# Patient Record
Sex: MALE | Race: ASIAN | NOT HISPANIC OR LATINO | URBAN - METROPOLITAN AREA
[De-identification: names, ages, dates, MRNs, and addresses within clinical notes are randomized per-mention and may not be internally consistent; named-entity substitution may affect disease eponyms.]

---

## 2017-09-08 ENCOUNTER — TRANSFERRED RECORDS (OUTPATIENT)
Dept: HEALTH INFORMATION MANAGEMENT | Facility: CLINIC | Age: 52
End: 2017-09-08

## 2017-10-02 PROBLEM — F33.9 EPISODE OF RECURRENT MAJOR DEPRESSIVE DISORDER (H): Status: ACTIVE | Noted: 2017-10-02

## 2017-10-02 PROBLEM — F43.10 PTSD (POST-TRAUMATIC STRESS DISORDER): Status: ACTIVE | Noted: 2017-10-02

## 2021-05-15 ENCOUNTER — IMMUNIZATION (OUTPATIENT)
Dept: FAMILY MEDICINE | Facility: CLINIC | Age: 56
End: 2021-05-15
Payer: COMMERCIAL

## 2021-05-15 PROCEDURE — 0001A PR COVID VAC PFIZER DIL RECON 30 MCG/0.3 ML IM: CPT

## 2021-05-15 PROCEDURE — 91300 PR COVID VAC PFIZER DIL RECON 30 MCG/0.3 ML IM: CPT

## 2021-06-05 ENCOUNTER — IMMUNIZATION (OUTPATIENT)
Dept: FAMILY MEDICINE | Facility: CLINIC | Age: 56
End: 2021-06-05
Attending: FAMILY MEDICINE
Payer: COMMERCIAL

## 2021-06-05 PROCEDURE — 91300 PR COVID VAC PFIZER DIL RECON 30 MCG/0.3 ML IM: CPT

## 2021-06-05 PROCEDURE — 0002A PR COVID VAC PFIZER DIL RECON 30 MCG/0.3 ML IM: CPT

## 2022-01-14 ENCOUNTER — IMMUNIZATION (OUTPATIENT)
Dept: FAMILY MEDICINE | Facility: CLINIC | Age: 57
End: 2022-01-14
Payer: COMMERCIAL

## 2022-01-14 PROCEDURE — 99207 PR NO CHARGE LOS: CPT

## 2022-01-14 PROCEDURE — 91306 COVID-19,PF,MODERNA (18+ YRS BOOSTER .25ML): CPT

## 2022-01-14 PROCEDURE — 0064A COVID-19,PF,MODERNA (18+ YRS BOOSTER .25ML): CPT

## 2022-01-14 NOTE — NURSING NOTE
Due to patient being non-English speaking/uses sign language, an  was used for this visit. Only for face-to-face interpretation by an external agency, date and length of interpretation can be found on the scanned worksheet.     name: POWER   Agency: [employer]  Language: Lorne Olivia    Telephone number:   Type of interpretation: Face-to-face, spoken  '

## 2024-02-23 ENCOUNTER — OFFICE VISIT (OUTPATIENT)
Dept: FAMILY MEDICINE | Facility: CLINIC | Age: 59
End: 2024-02-23
Payer: COMMERCIAL

## 2024-02-23 VITALS
WEIGHT: 165.2 LBS | TEMPERATURE: 97.7 F | HEART RATE: 90 BPM | OXYGEN SATURATION: 97 % | SYSTOLIC BLOOD PRESSURE: 146 MMHG | DIASTOLIC BLOOD PRESSURE: 98 MMHG | BODY MASS INDEX: 27.52 KG/M2 | HEIGHT: 65 IN | RESPIRATION RATE: 20 BRPM

## 2024-02-23 DIAGNOSIS — R03.0 ELEVATED BLOOD PRESSURE READING WITHOUT DIAGNOSIS OF HYPERTENSION: ICD-10-CM

## 2024-02-23 DIAGNOSIS — Z11.59 NEED FOR HEPATITIS C SCREENING TEST: ICD-10-CM

## 2024-02-23 DIAGNOSIS — Z00.00 ANNUAL PHYSICAL EXAM: Primary | ICD-10-CM

## 2024-02-23 DIAGNOSIS — Z11.4 SCREENING FOR HIV (HUMAN IMMUNODEFICIENCY VIRUS): ICD-10-CM

## 2024-02-23 DIAGNOSIS — Z00.00 ROUTINE GENERAL MEDICAL EXAMINATION AT A HEALTH CARE FACILITY: ICD-10-CM

## 2024-02-23 DIAGNOSIS — E66.3 OVERWEIGHT (BMI 25.0-29.9): ICD-10-CM

## 2024-02-23 DIAGNOSIS — Z12.5 SCREENING FOR PROSTATE CANCER: ICD-10-CM

## 2024-02-23 DIAGNOSIS — Z12.11 SCREEN FOR COLON CANCER: ICD-10-CM

## 2024-02-23 DIAGNOSIS — Z71.6 ENCOUNTER FOR TOBACCO USE CESSATION COUNSELING: ICD-10-CM

## 2024-02-23 DIAGNOSIS — Z87.891 PERSONAL HISTORY OF TOBACCO USE: ICD-10-CM

## 2024-02-23 LAB
ANION GAP SERPL CALCULATED.3IONS-SCNC: 9 MMOL/L (ref 7–15)
BUN SERPL-MCNC: 14.4 MG/DL (ref 6–20)
CALCIUM SERPL-MCNC: 9.3 MG/DL (ref 8.6–10)
CHLORIDE SERPL-SCNC: 105 MMOL/L (ref 98–107)
CHOLEST SERPL-MCNC: 171 MG/DL
CREAT SERPL-MCNC: 0.67 MG/DL (ref 0.67–1.17)
DEPRECATED HCO3 PLAS-SCNC: 24 MMOL/L (ref 22–29)
EGFRCR SERPLBLD CKD-EPI 2021: >90 ML/MIN/1.73M2
ERYTHROCYTE [DISTWIDTH] IN BLOOD BY AUTOMATED COUNT: 11.8 % (ref 10–15)
FASTING STATUS PATIENT QL REPORTED: ABNORMAL
GLUCOSE SERPL-MCNC: 146 MG/DL (ref 70–99)
HBA1C MFR BLD: 5.2 % (ref 0–5.6)
HCT VFR BLD AUTO: 41.9 % (ref 40–53)
HCV AB SERPL QL IA: NONREACTIVE
HDLC SERPL-MCNC: 37 MG/DL
HGB BLD-MCNC: 13.8 G/DL (ref 13.3–17.7)
HIV 1+2 AB+HIV1 P24 AG SERPL QL IA: NONREACTIVE
LDLC SERPL CALC-MCNC: 113 MG/DL
MCH RBC QN AUTO: 28.7 PG (ref 26.5–33)
MCHC RBC AUTO-ENTMCNC: 32.9 G/DL (ref 31.5–36.5)
MCV RBC AUTO: 87 FL (ref 78–100)
NONHDLC SERPL-MCNC: 134 MG/DL
PLATELET # BLD AUTO: 235 10E3/UL (ref 150–450)
POTASSIUM SERPL-SCNC: 3.7 MMOL/L (ref 3.4–5.3)
PSA SERPL DL<=0.01 NG/ML-MCNC: 1.09 NG/ML (ref 0–3.5)
RBC # BLD AUTO: 4.81 10E6/UL (ref 4.4–5.9)
SODIUM SERPL-SCNC: 138 MMOL/L (ref 135–145)
TRIGL SERPL-MCNC: 107 MG/DL
WBC # BLD AUTO: 6.5 10E3/UL (ref 4–11)

## 2024-02-23 PROCEDURE — 80061 LIPID PANEL: CPT

## 2024-02-23 PROCEDURE — 83036 HEMOGLOBIN GLYCOSYLATED A1C: CPT

## 2024-02-23 PROCEDURE — 86803 HEPATITIS C AB TEST: CPT

## 2024-02-23 PROCEDURE — 99386 PREV VISIT NEW AGE 40-64: CPT | Mod: 25

## 2024-02-23 PROCEDURE — 90471 IMMUNIZATION ADMIN: CPT

## 2024-02-23 PROCEDURE — 80048 BASIC METABOLIC PNL TOTAL CA: CPT

## 2024-02-23 PROCEDURE — 87389 HIV-1 AG W/HIV-1&-2 AB AG IA: CPT

## 2024-02-23 PROCEDURE — 90480 ADMN SARSCOV2 VAC 1/ONLY CMP: CPT

## 2024-02-23 PROCEDURE — 84153 ASSAY OF PSA TOTAL: CPT

## 2024-02-23 PROCEDURE — 91320 SARSCV2 VAC 30MCG TRS-SUC IM: CPT

## 2024-02-23 PROCEDURE — 99213 OFFICE O/P EST LOW 20 MIN: CPT | Mod: 25

## 2024-02-23 PROCEDURE — 90677 PCV20 VACCINE IM: CPT

## 2024-02-23 PROCEDURE — 85027 COMPLETE CBC AUTOMATED: CPT

## 2024-02-23 PROCEDURE — 36415 COLL VENOUS BLD VENIPUNCTURE: CPT

## 2024-02-23 RX ORDER — VARENICLINE TARTRATE 0.5 (11)-1
KIT ORAL
Qty: 53 TABLET | Refills: 0 | Status: SHIPPED | OUTPATIENT
Start: 2024-02-23 | End: 2024-05-10

## 2024-02-23 SDOH — HEALTH STABILITY: PHYSICAL HEALTH: ON AVERAGE, HOW MANY MINUTES DO YOU ENGAGE IN EXERCISE AT THIS LEVEL?: 10 MIN

## 2024-02-23 SDOH — HEALTH STABILITY: PHYSICAL HEALTH: ON AVERAGE, HOW MANY DAYS PER WEEK DO YOU ENGAGE IN MODERATE TO STRENUOUS EXERCISE (LIKE A BRISK WALK)?: 4 DAYS

## 2024-02-23 ASSESSMENT — PATIENT HEALTH QUESTIONNAIRE - PHQ9
SUM OF ALL RESPONSES TO PHQ QUESTIONS 1-9: 2
10. IF YOU CHECKED OFF ANY PROBLEMS, HOW DIFFICULT HAVE THESE PROBLEMS MADE IT FOR YOU TO DO YOUR WORK, TAKE CARE OF THINGS AT HOME, OR GET ALONG WITH OTHER PEOPLE: NOT DIFFICULT AT ALL
SUM OF ALL RESPONSES TO PHQ QUESTIONS 1-9: 2

## 2024-02-23 ASSESSMENT — SOCIAL DETERMINANTS OF HEALTH (SDOH): HOW OFTEN DO YOU GET TOGETHER WITH FRIENDS OR RELATIVES?: ONCE A WEEK

## 2024-02-23 NOTE — PATIENT INSTRUCTIONS
Lung Cancer Screening   Frequently Asked Questions  If you are at high-risk for lung cancer, getting screened with low-dose computed tomography (LDCT) every year can help save your life. This handout offers answers to some of the most common questions about lung cancer screening. If you have other questions, please call [phone] ([phone]).     What is it?  Lung cancer screening uses special X-ray technology to create an image of your lung tissue. The exam is quick and easy and takes less than 10 seconds. We don t give you any medicine or use any needles. You can eat before and after the exam. You don t need to change your clothes as long as the clothing on your chest doesn t contain metal. But, you do need to be able to hold your breath for at least 6 seconds during the exam.    What is the goal of lung cancer screening?  The goal of lung cancer screening is to save lives. Many times, lung cancer is not found until a person starts having physical symptoms. Lung cancer screening can help detect lung cancer in the earliest stages when it may be easier to treat.    Who should be screened for lung cancer?  We suggest lung cancer screening for anyone who is at high-risk for lung cancer. You are in the high-risk group if you:      are between the ages of 55 and 79, and    have smoked at least 1 pack of cigarettes a day for 20 or more years, and    still smoke or have quit within the past 15 years.    However, if you have a new cough or shortness of breath, you should talk to your doctor before being screened.    Why does it matter if I have symptoms?  Certain symptoms can be a sign that you have a condition in your lungs that should be checked and treated by your doctor. These symptoms include fever, chest pain, a new or changing cough, shortness of breath that you have never felt before, coughing up blood or unexplained weight loss. Having any of these symptoms can greatly affect the results of lung  cancer screening.       Should all smokers get an LDCT lung cancer screening exam?  It depends. Lung cancer screening is for a very specific group of men and women who have a history of heavy smoking over a long period of time (see  Who should be screened for lung cancer  above).  I am in the high-risk group, but have been diagnosed with cancer in the past. Is LDCT lung cancer screening right for me?  In some cases, you should not have LDCT lung screening, such as when your doctor is already following your cancer with CT scan studies. Your doctor will help you decide if LDCT lung screening is right for you.  Do I need to have a screening exam every year?  Yes. If you are in the high-risk group described earlier, you should get an LDCT lung cancer screening exam every year until you are 79, or are no longer willing or able to undergo screening and possible procedures to diagnose and treat lung cancer.  How effective is LDCT at preventing death from lung cancer?  Studies have shown that LDCT lung cancer screening can lower the risk of death from lung cancer by 20 percent in people who are at high-risk.  What are the risks?  There are some risks and limitations of LDCT lung cancer screening. We want to make sure you understand the risks and benefits, so please let us know if you have any questions. Your doctor may want to talk with you more about these risks.    Radiation exposure: As with any exam that uses radiation, there is a very small increased risk of cancer. The amount of radiation in LDCT is small--about the same amount a person would get from a mammogram. Your doctor orders the exam when he or she feels the potential benefits outweigh the risks.    False negatives: No test is perfect, including LDCT. It is possible that you may have a medical condition, including lung cancer, that is not found during your exam. This is called a false negative result.    False positives and more testing: LDCT very often finds  something in the lung that could be cancer, but in fact is not. This is called a false positive result. False positive tests often cause anxiety. To make sure these findings are not cancer, you may need to have more tests. These tests will be done only if you give us permission. Sometimes patients need a treatment that can have side effects, such as a biopsy. For more information on false positives, see  What can I expect from the results?     Findings not related to lung cancer: Your LDCT exam also takes pictures of areas of your body next to your lungs. In a very small number of cases, the CT scan will show an abnormal finding in one of these areas, such as your kidneys, adrenal glands, liver or thyroid. This finding may not be serious, but you may need more tests. Your doctor can help you decide what other tests you may need, if any.  What can I expect from the results?  About 1 out of 4 LDCT exams will find something that may need more tests. Most of the time, these findings are lung nodules. Lung nodules are very small collections of tissue in the lung. These nodules are very common, and the vast majority--more than 97 percent--are not cancer (benign). Most are normal lymph nodes or small areas of scarring from past infections.  But, if a small lung nodule is found to be cancer, the cancer can be cured more than 90 percent of the time. To know if the nodule is cancer, we may need to get more images before your next yearly screening exam. If the nodule has suspicious features (for example, it is large, has an odd shape or grows over time), we will refer you to a specialist for further testing.  Will my doctor also get the results?  Yes. Your doctor will get a copy of your results.  Is it okay to keep smoking now that there s a cancer screening exam?  No. Tobacco is one of the strongest cancer-causing agents. It causes not only lung cancer, but other cancers and cardiovascular (heart) diseases as well. The damage  caused by smoking builds over time. This means that the longer you smoke, the higher your risk of disease. While it is never too late to quit, the sooner you quit, the better.  Where can I find help to quit smoking?  The best way to prevent lung cancer is to stop smoking. If you have already quit smoking, congratulations and keep it up! For help on quitting smoking, please call [employer] at 5-618-QUITNOW (1-557.413.1703) or the American Cancer Society at 1-632.837.9656 to find local resources near you.  One-on-one health coaching:  If you d prefer to work individually with a health care provider on tobacco cessation, we offer:      Medication Therapy Management:  Our specially trained pharmacists work closely with you and your doctor to help you quit smoking.  Call [phone] or [phone] (toll free).    Preventive Care Advice   This is general advice given by our system to help you stay healthy. However, your care team may have specific advice just for you. Please talk to your care team about your preventive care needs.  Nutrition  Eat 5 or more servings of fruits and vegetables each day.  Try wheat bread, brown rice and whole grain pasta (instead of white bread, rice, and pasta).  Get enough calcium and vitamin D. Check the label on foods and aim for 100% of the RDA (recommended daily allowance).  Lifestyle  Exercise at least 150 minutes each week  (30 minutes a day, 5 days a week).  Do muscle strengthening activities 2 days a week. These help control your weight and prevent disease.  No smoking.  Wear sunscreen to prevent skin cancer.  Have a dental exam and cleaning every 6 months.  Yearly exams  See your health care team every year to talk about:  Any changes in your health.  Any medicines your care team has prescribed.  Preventive care, family planning, and ways to prevent chronic diseases.  Shots (vaccines)   HPV shots (up to age 26), if you've never had them before.  Hepatitis B shots (up to age 59),  if you've never had them before.  COVID-19 shot: Get this shot when it's due.  Flu shot: Get a flu shot every year.  Tetanus shot: Get a tetanus shot every 10 years.  Pneumococcal, hepatitis A, and RSV shots: Ask your care team if you need these based on your risk.  Shingles shot (for age 50 and up)  General health tests  Diabetes screening:  Starting at age 35, Get screened for diabetes at least every 3 years.  If you are younger than age 35, ask your care team if you should be screened for diabetes.  Cholesterol test: At age 39, start having a cholesterol test every 5 years, or more often if advised.  Bone density scan (DEXA): At age 50, ask your care team if you should have this scan for osteoporosis (brittle bones).  Hepatitis C: Get tested at least once in your life.  STIs (sexually transmitted infections)  Before age 24: Ask your care team if you should be screened for STIs.  After age 24: Get screened for STIs if you're at risk. You are at risk for STIs (including HIV) if:  You are sexually active with more than one person.  You don't use condoms every time.  You or a partner was diagnosed with a sexually transmitted infection.  If you are at risk for HIV, ask about PrEP medicine to prevent HIV.  Get tested for HIV at least once in your life, whether you are at risk for HIV or not.  Cancer screening tests  Cervical cancer screening: If you have a cervix, begin getting regular cervical cancer screening tests starting at age 21.  Breast cancer scan (mammogram): If you've ever had breasts, begin having regular mammograms starting at age 40. This is a scan to check for breast cancer.  Colon cancer screening: It is important to start screening for colon cancer at age 45.  Have a colonoscopy test every 10 years (or more often if you're at risk) Or, ask your provider about stool tests like a FIT test every year or Cologuard test every 3 years.  To learn more about your testing options, visit:    https://www.[employer]/173387.pdf.  For help making a decision, visit:   https://bit.ly/yx90047.  Prostate cancer screening test: If you have a prostate, ask your care team if a prostate cancer screening test (PSA) at age 55 is right for you.  Lung cancer screening: If you are a current or former smoker ages 50 to 80, ask your care team if ongoing lung cancer screenings are right for you.  For informational purposes only. Not to replace the advice of your health care provider. Copyright   2023 [employer]. All rights reserved. Clinically reviewed by the [facility] Transitions Program. [employer] 135662 - REV 01/24.

## 2024-02-23 NOTE — PROGRESS NOTES
"Preventive Care Visit  [facility] , Resident in organized health care education/training program  Feb 23, 2024    Assessment & Plan     Annual physical exam  Routine general medical examination at a health care facility  - Lipid panel reflex to direct LDL Non-fasting  - CBC with platelets  - Basic metabolic panel  - Hemoglobin A1c    Screen for colon cancer  - Fecal colorectal cancer screen FIT    Screening for HIV (human immunodeficiency virus)  - HIV Screening    Need for hepatitis C screening test  - Hepatitis C Screen Reflex to HCV RNA Quant and Genotype    Elevated blood pressure reading without diagnosis of hypertension  Previous readings here were normal. Encouraged to quit smoking and continue working on lifestyle changes.   - Home Blood Pressure Monitor Order for DME - ONLY FOR DME  - Follow up with BP log in about 4 weeks     Encounter for tobacco use cessation counseling  Personal history of tobacco use  Patient reports motivation to quit smoking. Currently smoking 0.5 PPD, 20+ years. Would like to try Chantix and have gum as needed.   - nicotine (NICORETTE) 2 MG gum  Dispense: 90 each; Refill: 3  - varenicline (CHANTIX GABY) 0.5 MG X 11 & 1 MG X 42 tablet  Dispense: 53 tablet; Refill: 0  - Prof fee: Shared Decision Making for Lung Cancer Screening  - CT Chest Lung Cancer Scrn Low Dose wo  - SMOKING CESSATION COUNSELING 3-10 MIN      Nicotine/Tobacco Cessation  He reports that he has been smoking cigarettes. He has been smoking an average of 0.5 packs per day. He does not have any smokeless tobacco history on file.  Nicotine/Tobacco Cessation Plan  Pharmacotherapies : varenicline (Chantix) and Nicotine gum      BMI  Estimated body mass index is 27.49 kg/m  as calculated from the following:    Height as of this encounter: 1.651 m (5' 5\").    Weight as of this encounter: 74.9 kg (165 lb 3.2 oz).   Weight management plan: Discussed healthy diet and exercise " guidelines    Counseling  Appropriate preventive services were discussed with this patient, including applicable screening as appropriate for fall prevention, nutrition, physical activity, Tobacco-use cessation, weight loss and cognition.  Checklist reviewing preventive services available has been given to the patient.  Reviewed patient's diet, addressing concerns and/or questions.   The patient was instructed to see the dentist every 6 months.       Follow up in 4 weeks.     Jaylene Nava is a 58 year old, presenting for the following:  Physical (Cpe )        2/23/2024    10:04 AM   Additional Questions   Roomed by TIMOTEO chan MA   Accompanied by Self         2/23/2024    Information    services provided? Yes   Miki Olivia   Type of interpretation provided Face-to-face    name Valerio Loving    Agency [employer]        Health Care Directive  Patient does not have a Health Care Directive or Living Will: Discussed advance care planning with patient; information given to patient to review.    KAYLENE Elaine is a 58 year old male here for CPE. Feels his health is well overall. Has been having some increased stress with his wife's health, but feels he has good support and coping skills. Spicy foods cause epigastric pain/GERD, otherwise feels well. Tries to eat a balanced diet. Active at work.      Works Monday-Thursday, where they do stretching and movement there. Works at [employer].     Smoking half pack per day cigarettes. Motivated to quit. Nicotine gum has helped in the past.         2/23/2024   General Health   How would you rate your overall physical health? Good   Feel stress (tense, anxious, or unable to sleep) Not at all         2/23/2024   Nutrition   Three or more servings of calcium each day? Yes   Diet: Regular (no restrictions)   How many servings of fruit and vegetables per day? (!) 2-3   How many sweetened beverages each day? 0-1         2/23/2024   Exercise   Days  "per week of moderate/strenous exercise 4 days   Average minutes spent exercising at this level 10 min         2024   Social Factors   Frequency of gathering with friends or relatives Once a week   Worry food won't last until get money to buy more No   Food not last or not have enough money for food? No   Do you have housing?  Yes   Are you worried about losing your housing? No   Lack of transportation? No   Unable to get utilities (heat,electricity)? No         2024   Fall Risk   Fallen 2 or more times in the past year? No   Trouble with walking or balance? No          2024   Dental   Dentist two times every year? (!) NO          Today's PHQ-9 Score:       2024    10:04 AM   PHQ-9 SCORE   PHQ-9 Total Score MyChart 2 (Minimal depression)   PHQ-9 Total Score 2         2024   Substance Use   Alcohol more than 3/day or more than 7/wk No   Do you use any other substances recreationally? No     Social History     Tobacco Use    Smoking status: Every Day     Packs/day: .5     Types: Cigarettes   Substance Use Topics    Alcohol use: Never    Drug use: Never             2024   One time HIV Screening   Previous HIV test? No         2024   STI Screening   New sexual partner(s) since last STI/HIV test? No   Last PSA: No results found for: \"PSA\"  ASCVD Risk   The ASCVD Risk score (Pete PEARCE, et al., 2019) failed to calculate for the following reasons:    Cannot find a previous HDL lab    Cannot find a previous total cholesterol lab    Fracture Risk Assessment Tool  Link to Frax Calculator  Use the information below to complete the Frax calculator  : 1965  Sex: male  Weight (kg): 74.9 kg (actual weight)  Height (cm): 165.1 cm  Previous Fragility Fracture:  No  History of parent with fractured hip:  No  Current Smoking:  Yes  Patient has been on glucocorticoids for more than 3 months (5mg/day or more): No  Rheumatoid Arthritis on Problem List:  No  Secondary Osteoporosis on " "Problem List:  No  Consumes 3 or more units of alcohol per day: No  Femoral Neck BMD (g/cm2)           Reviewed and updated as needed this visit by Provider         Piotr Nicole            No past medical history on file.  No past surgical history on file.  Lab work is in process        Review of Systems  Constitutional, HEENT, cardiovascular, pulmonary, gi and gu systems are negative, except as otherwise noted.     Objective    Exam  BP (!) 146/98   Pulse 90   Temp 97.7  F (36.5  C) (Tympanic)   Resp 20   Ht 1.651 m (5' 5\")   Wt 74.9 kg (165 lb 3.2 oz)   SpO2 97%   BMI 27.49 kg/m     Estimated body mass index is 27.49 kg/m  as calculated from the following:    Height as of this encounter: 1.651 m (5' 5\").    Weight as of this encounter: 74.9 kg (165 lb 3.2 oz).    Physical Exam  GENERAL: alert and no distress  EYES: Eyes grossly normal to inspection, PERRL and conjunctivae and sclerae normal  HENT: ear canals and TM's normal, nose and mouth without ulcers or lesions  NECK: no adenopathy, no asymmetry, masses, or scars  RESP: lungs clear to auscultation - no rales, rhonchi or wheezes  CV: regular rate and rhythm, normal S1 S2, no S3 or S4, no murmur, click or rub, no peripheral edema  ABDOMEN: soft, nontender, no hepatosplenomegaly, no masses and bowel sounds normal  MS: no gross musculoskeletal defects noted, no edema  SKIN: no suspicious lesions or rashes  PSYCH: mentation appears normal, affect normal/bright      Signed Electronically by: Samira Carter DO      Answers submitted by the patient for this visit:  Patient Health Questionnaire (Submitted on 2/23/2024)  If you checked off any problems, how difficult have these problems made it for you to do your work, take care of things at home, or get along with other people?: Not difficult at all  PHQ9 TOTAL SCORE: 2  Lung Cancer Screening Shared Decision Making Visit     Elijah Elaine, a 58 year old male, is eligible for lung cancer screening    History "   Smoking Status    Every Day    Packs/day: 0.50    Types: Cigarettes   Smokeless Tobacco    Not on file     I have discussed with patient the risks and benefits of screening for lung cancer with low-dose CT.     The risks include:    radiation exposure: one low dose chest CT has as much ionizing radiation as about 15 chest x-rays, or 6 months of background radiation living in [place]      false positives: most findings/nodules are NOT cancer, but some might still require additional diagnostic evaluation, including biopsy    over-diagnosis: some slow growing cancers that might never have been clinically significant will be detected and treated unnecessarily     The benefit of early detection of lung cancer is contingent upon adherence to annual screening or more frequent follow up if indicated.     Furthermore, to benefit from screening, Chit must be willing and able to undergo diagnostic procedures, if indicated. Although no specific guide is available for determining severity of comorbidities, it is reasonable to withhold screening in patients who have greater mortality risk from other diseases.     We did discuss that the best way to prevent lung cancer is to not smoke.    Some patients may value a numeric estimation of lung cancer risk when evaluating if lung cancer screening is right for them, here is one calculator:    ShouldIScreen

## 2024-02-23 NOTE — PROGRESS NOTES
Prior to immunization administration, verified patients identity using patient s name and date of birth. Please see Immunization Activity for additional information.     Screening Questionnaire for Adult Immunization    Are you sick today?   No   Do you have allergies to medications, food, a vaccine component or latex?   No   Have you ever had a serious reaction after receiving a vaccination?   No   Do you have a long-term health problem with heart, lung, kidney, or metabolic disease (e.g., diabetes), asthma, a blood disorder, no spleen, complement component deficiency, a cochlear implant, or a spinal fluid leak?  Are you on long-term aspirin therapy?   No   Do you have cancer, leukemia, HIV/AIDS, or any other immune system problem?   No   Do you have a parent, brother, or sister with an immune system problem?   No   In the past 3 months, have you taken medications that affect  your immune system, such as prednisone, other steroids, or anticancer drugs; drugs for the treatment of rheumatoid arthritis, Crohn s disease, or psoriasis; or have you had radiation treatments?   No   Have you had a seizure, or a brain or other nervous system problem?   No   During the past year, have you received a transfusion of blood or blood    products, or been given immune (gamma) globulin or antiviral drug?   No   For women: Are you pregnant or is there a chance you could become       pregnant during the next month?   No   Have you received any vaccinations in the past 4 weeks?   No     Immunization questionnaire answers were all negative.      Patient instructed to remain in clinic for 15 minutes afterwards, and to report any adverse reactions.     Screening performed by Kourtney Guevara MA on 2/23/2024 at 11:12 AM.

## 2024-02-24 NOTE — PROGRESS NOTES
Preceptor Attestation:    I discussed the patient with the resident and evaluated the patient in person.Answers submitted by the patient for this visit:  Patient Health Questionnaire (Submitted on 2/23/2024)  If you checked off any problems, how difficult have these problems made it for you to do your work, take care of things at home, or get along with other people?: Not difficult at all  PHQ9 TOTAL SCORE: 2   I have verified the content of the note, which accurately reflects my assessment of the patient and the plan of care.   Supervising Physician:  Holland Rodriguez MD.

## 2024-02-28 PROCEDURE — 82274 ASSAY TEST FOR BLOOD FECAL: CPT

## 2024-02-29 LAB — HEMOCCULT STL QL IA: NEGATIVE

## 2024-03-22 ENCOUNTER — OFFICE VISIT (OUTPATIENT)
Dept: FAMILY MEDICINE | Facility: CLINIC | Age: 59
End: 2024-03-22
Payer: COMMERCIAL

## 2024-03-22 VITALS
DIASTOLIC BLOOD PRESSURE: 99 MMHG | RESPIRATION RATE: 16 BRPM | HEIGHT: 65 IN | WEIGHT: 161.8 LBS | OXYGEN SATURATION: 97 % | TEMPERATURE: 98 F | HEART RATE: 85 BPM | BODY MASS INDEX: 26.96 KG/M2 | SYSTOLIC BLOOD PRESSURE: 153 MMHG

## 2024-03-22 DIAGNOSIS — R03.0 ELEVATED BLOOD PRESSURE READING WITHOUT DIAGNOSIS OF HYPERTENSION: Primary | ICD-10-CM

## 2024-03-22 DIAGNOSIS — Z71.6 ENCOUNTER FOR TOBACCO USE CESSATION COUNSELING: ICD-10-CM

## 2024-03-22 DIAGNOSIS — Z23 NEED FOR PROPHYLACTIC VACCINATION AND INOCULATION AGAINST INFLUENZA: ICD-10-CM

## 2024-03-22 DIAGNOSIS — Z23 ENCOUNTER FOR IMMUNIZATION: ICD-10-CM

## 2024-03-22 PROCEDURE — 90471 IMMUNIZATION ADMIN: CPT

## 2024-03-22 PROCEDURE — 99213 OFFICE O/P EST LOW 20 MIN: CPT | Mod: 25

## 2024-03-22 PROCEDURE — 90686 IIV4 VACC NO PRSV 0.5 ML IM: CPT

## 2024-03-22 NOTE — PROGRESS NOTES
Assessment & Plan   Elijah Elaine 58 year old male here for follow up. PMH MDD, PTSD. Last visit, his BP was elevated at 146/98, no history of hypertension. BP at  home reportedly 135/85, checked one time. BP in clinic elevated at this visit and last, not at goal. Discussed lifestyle interventions and medications. Patient wishes to try lifestyle changes first.     Elevated blood pressure reading without diagnosis of hypertension  - Continue to work on smoking cessation, diet modifications  - Bring BP cuff and BP readings to next appointment  - If home BP elevated, start amlodipine at next appointment as well as obtain UA and EKG for initial HTN workup    Need for prophylactic vaccination and inoculation against influenza  - Flu shot given today    Tobacco Use  Now down to 3 cigarettes per day with nicotine gum, which has been helpful. Patient plans to start Chantix soon too, he has this at home.   - CT chest for lung cancer screening ordered at previous appointment  - Appreciate referrals coordinator assistance in schedule CT  - Continue nicotine gum, start Chantix. Discussed side effects.       Return in about 4 weeks (around 4/19/2024) for Follow up BP.    Subjective   Elijah is a 58 year old, presenting for the following health issues:  Hypertension (Bp ck ), Results (Go over labs ), Imm/Inj (Flu Shot), and Immunization        3/22/2024     2:00 PM   Additional Questions   Roomed by TIMOTEO Guevara MA   Accompanied by Self         3/22/2024    Information    services provided? Yes   Miki Olivia   Type of interpretation provided Face-to-face    name Valerio Morales    [employer]   Elijah Elaine 58 year old male here for follow up. PMH MDD, PTSD. Last visit, his BP was elevated at 146/98, no history of hypertension.     Has a BP cuff at home.   At home 135/85, he checked one time.   Feels well, asymptomatic from Htn standpoint.     Is working on quitting smoking. Now smoking 3  "cigarettes per day. Has Chantix, but has not started yet. Nicotine gum has been helpful. Is motivated to quit smoking altogether.     Review of Systems  Constitutional, HEENT, cardiovascular, pulmonary, gi and gu systems are negative, except as otherwise noted.      Objective    BP (!) 153/99   Pulse 85   Temp 98  F (36.7  C) (Oral)   Resp 16   Ht 1.651 m (5' 5\")   Wt 73.4 kg (161 lb 12.8 oz)   SpO2 97%   BMI 26.92 kg/m    Body mass index is 26.92 kg/m .  Physical Exam   GENERAL: alert and no distress  EYES: Eyes grossly normal to inspection, conjunctivae and sclerae normal  RESP: Breathing comfortably on room air, not in respiratory distress  CV: regular rate, acyanotic, no peripheral edema  MS: no gross musculoskeletal defects noted, no edema    Office Visit on 02/23/2024   Component Date Value Ref Range Status    Occult Blood Screen FIT 02/28/2024 Negative  Negative Final    HIV Antigen Antibody Combo 02/23/2024 Nonreactive  Nonreactive Final    Negative HIV-1/-2 antigen and antibody screening test results usually indicate the absence of HIV-1 and HIV-2 infection. However, such negative results do not rule-out acute HIV infection.  If acute HIV-1 or HIV-2 infection is suspected, detection of HIV-1 or HIV-2 RNA  is recommended.     Hepatitis C Antibody 02/23/2024 Nonreactive  Nonreactive Final    A nonreactive screening test result does not exclude the possibility of exposure to or infection with HCV. Nonreactive screening test results in individuals with prior exposure to HCV may be due to antibody levels below the limit of detection of this assay or lack of reactivity to the HCV antigens used in this assay. Patients with recent HCV infections (<3 months from time of exposure) may have false-negative HCV antibody results due to the time needed for seroconversion (average of 8 to 9 weeks).    Cholesterol 02/23/2024 171  <200 mg/dL Final    Triglycerides 02/23/2024 107  <150 mg/dL Final    Direct Measure HDL " 02/23/2024 37 (L)  >=40 mg/dL Final    LDL Cholesterol Calculated 02/23/2024 113 (H)  <=100 mg/dL Final    Non HDL Cholesterol 02/23/2024 134 (H)  <130 mg/dL Final    Patient Fasting > 8hrs? 02/23/2024 Unknown   Final    WBC Count 02/23/2024 6.5  4.0 - 11.0 10e3/uL Final    RBC Count 02/23/2024 4.81  4.40 - 5.90 10e6/uL Final    Hemoglobin 02/23/2024 13.8  13.3 - 17.7 g/dL Final    Hematocrit 02/23/2024 41.9  40.0 - 53.0 % Final    MCV 02/23/2024 87  78 - 100 fL Final    MCH 02/23/2024 28.7  26.5 - 33.0 pg Final    MCHC 02/23/2024 32.9  31.5 - 36.5 g/dL Final    RDW 02/23/2024 11.8  10.0 - 15.0 % Final    Platelet Count 02/23/2024 235  150 - 450 10e3/uL Final    Sodium 02/23/2024 138  135 - 145 mmol/L Final    Reference intervals for this test were updated on 09/26/2023 to more accurately reflect our healthy population. There may be differences in the flagging of prior results with similar values performed with this method. Interpretation of those prior results can be made in the context of the updated reference intervals.     Potassium 02/23/2024 3.7  3.4 - 5.3 mmol/L Final    Chloride 02/23/2024 105  98 - 107 mmol/L Final    Carbon Dioxide (CO2) 02/23/2024 24  22 - 29 mmol/L Final    Anion Gap 02/23/2024 9  7 - 15 mmol/L Final    Urea Nitrogen 02/23/2024 14.4  6.0 - 20.0 mg/dL Final    Creatinine 02/23/2024 0.67  0.67 - 1.17 mg/dL Final    GFR Estimate 02/23/2024 >90  >60 mL/min/1.73m2 Final    Calcium 02/23/2024 9.3  8.6 - 10.0 mg/dL Final    Glucose 02/23/2024 146 (H)  70 - 99 mg/dL Final    Hemoglobin A1C 02/23/2024 5.2  0.0 - 5.6 % Final    Normal <5.7%   Prediabetes 5.7-6.4%    Diabetes 6.5% or higher     Note: Adopted from ADA consensus guidelines.    PSA Tumor Marker 02/23/2024 1.09  0.00 - 3.50 ng/mL Final         Discussed with attending, Dr. Dunn.   Signed Electronically by: Samira Carter DO PGY2

## 2024-04-05 ENCOUNTER — HOSPITAL ENCOUNTER (OUTPATIENT)
Dept: CT IMAGING | Facility: HOSPITAL | Age: 59
Discharge: HOME | End: 2024-04-05
Attending: FAMILY MEDICINE | Admitting: FAMILY MEDICINE
Payer: COMMERCIAL

## 2024-04-05 DIAGNOSIS — Z71.6 ENCOUNTER FOR TOBACCO USE CESSATION COUNSELING: ICD-10-CM

## 2024-04-05 PROCEDURE — 71271 CT THORAX LUNG CANCER SCR C-: CPT

## 2024-04-05 NOTE — LETTER
April 12, 2024      [facility]  [address] [place] [address]        Dear ,    We are writing to inform you of your test results.    Your CT results are unchanged from prior, which is good news. You should continue with your yearly CT scans for lung cancer screening.     Resulted Orders   CT Chest Lung Cancer Scrn Low Dose wo    Narrative    EXAM: LOW DOSE LUNG CANCER SCREENING CT CHEST  LOCATION: [facility]  DATE: 4/5/2024    INDICATION: Lung cancer screening. History of smoking. High risk patient.  COMPARISON: None.    TECHNIQUE: Low-dose lung cancer screening non-contrast CT chest. Dose   reduction techniques were used. Images assessed using LungRADS 2022   criteria.    FINDINGS:  NODULES: 4 x 6 mm middle lobe nodule (series 4, image 147). 4 x 6 mm right   minor fissure nodule (image 112). 5 mm middle lobe subpleural nodule   (image 153). 5 mm posterior - medial right upper lobe paramediastinal   nodule (image 71). Partially mineralized   5 mm posterior right apex nodule (image 51). 3 mm middle lobe nodule   subpleural nodule (image 165).    LUNGS AND PLEURA: Lungs are clear.    MEDIASTINUM: No adenopathy. Mildly tortuous aorta without focal aneurysm.   Normal heart size.    CORONARY ARTERY CALCIFICATION: Motion artifact. Nothing seen.    LIMITED UPPER ABDOMEN: No actionable findings.    MUSCULOSKELETAL: Degenerative changes spine.      Impression    IMPRESSION:  1.  Small pulmonary nodules.    LungRADS CATEGORY: 2 : Benign.  -Perifissural nodule(s): <10 mm  -Solid nodule(s): <6 mm on baseline screening; or new nodule <4 mm  -Subsolid nodule(s): <6 mm on baseline screening  -Ground glass nodule(s): <30 mm at baseline, new or slowly growing; 30 mm   or larger and unchanged or slowly growing  -Category 3 nodules that are unchanged for greater than or equal to 6   months  -Category 4 lesions proven to be benign following appropriate workup  -Airway nodule, subsegmental at  baseline, new or stable    RADIOLOGIST RECOMMENDATION(S): Continue annual screening with low-dose CT   chest in 12 months.         If you have any questions or concerns, please call the clinic at the number listed above.       Sincerely,      Samira Cartagena, DO

## 2024-05-09 NOTE — PROGRESS NOTES
Assessment & Plan   Elijah Elaine 58 year old male here for follow up. PMH MDD, PTSD. Last visit, his BP was elevated at 146/98, no history of hypertension. BP at  home reportedly 135/85, checked one time. BP in clinic elevated at this visit and last, not at goal. Discussed lifestyle interventions and medications. Patient wanted to try lifestyle changes first. Today, BP close to, but not at goal (132/90 on recheck), home BP also usually with diastolic > 90, sometimes systolic > 140. Agreeable to trial of low dose amlodipine today.     Hypertension  New diagnosis. Elevated BP despite working on smoking cessation and lifestyle changes. Continue to work on smoking cessation, diet modifications. Congratulated on 2 weeks of smoking cessation.   - Bring BP readings to next appointment  - START amlodipine 2.5 mg daily  - EKG wnl today  - UA today  - Reviewed BMP from 2/2024, wnl     Tobacco Use  Quit 2 weeks ago, congratulated. Using nicotine gum as needed.   - Continue nicotine gum as needed    Return in about 4 weeks (around 6/7/2024) for Follow up BP.    Subjective   Elijah is a 58 year old, presenting for the following health issues:  Follow Up (Blood pressure ) and Medication Reconciliation (Med reviewed)        3/22/2024     2:00 PM   Additional Questions   Roomed by TIMOTEO Guevara MA   Accompanied by Self         3/22/2024    Information    services provided? Yes   Miki Olivia   Type of interpretation provided Face-to-face    name Valerio Morales    [employer]   Elijah Elaine 58 year old male here for follow up HTN, tobacco use. Feels well. No other concerns today.     Home BP:       Tobacco use: Quit two weeks ago, did not use Chantix, just the nicotine gum; has occasional cravings in evening time, uses gum as needed for this.     Review of Systems  Constitutional, HEENT, cardiovascular, pulmonary, gi and gu systems are negative, except as otherwise noted.      Objective    BP  "(!) 132/90 (BP Location: Left arm, Patient Position: Sitting, Cuff Size: Adult Regular)   Pulse 75   Temp 98.4  F (36.9  C) (Oral)   Resp 20   Ht 1.645 m (5' 4.75\")   Wt 73.5 kg (162 lb)   SpO2 96%   BMI 27.17 kg/m    Body mass index is 27.17 kg/m .  Physical Exam   GENERAL: alert and no distress  EYES: Eyes grossly normal to inspection, conjunctivae and sclerae normal  RESP: Breathing comfortably on room air, not in respiratory distress  CV: regular rate, acyanotic, no peripheral edema  MS: no gross musculoskeletal defects noted, no edema     Sodium  135 - 145 mmol/L 138   Comment: Reference intervals for this test were updated on 09/26/2023 to more accurately reflect our healthy population. There may be differences in the flagging of prior results with similar values performed with this method. Interpretation of those prior results can be made in the context of the updated reference intervals.    Potassium  3.4 - 5.3 mmol/L 3.7    Chloride  98 - 107 mmol/L 105    Carbon Dioxide (CO2)  22 - 29 mmol/L 24    Anion Gap  7 - 15 mmol/L 9    Urea Nitrogen  6.0 - 20.0 mg/dL 14.4    Creatinine  0.67 - 1.17 mg/dL 0.67    GFR Estimate  >60 mL/min/1.73m2 >90    Calcium  8.6 - 10.0 mg/dL 9.3    Glucose  70 - 99 mg/dL 146 High      EKG: NSR, rate 75, no LVH or signs of ischemia, Qtc 444      Discussed with attending, Dr. Akhtar.   Signed Electronically by: Samira Carter DO PGY2  "

## 2024-05-10 ENCOUNTER — OFFICE VISIT (OUTPATIENT)
Dept: FAMILY MEDICINE | Facility: CLINIC | Age: 59
End: 2024-05-10
Payer: COMMERCIAL

## 2024-05-10 VITALS
OXYGEN SATURATION: 96 % | HEIGHT: 65 IN | WEIGHT: 162 LBS | RESPIRATION RATE: 20 BRPM | BODY MASS INDEX: 26.99 KG/M2 | DIASTOLIC BLOOD PRESSURE: 90 MMHG | TEMPERATURE: 98.4 F | SYSTOLIC BLOOD PRESSURE: 132 MMHG | HEART RATE: 75 BPM

## 2024-05-10 DIAGNOSIS — Z71.6 ENCOUNTER FOR TOBACCO USE CESSATION COUNSELING: ICD-10-CM

## 2024-05-10 DIAGNOSIS — Z87.891 PERSONAL HISTORY OF TOBACCO USE: ICD-10-CM

## 2024-05-10 DIAGNOSIS — I10 BENIGN ESSENTIAL HYPERTENSION: Primary | ICD-10-CM

## 2024-05-10 LAB
ALBUMIN UR-MCNC: NEGATIVE MG/DL
APPEARANCE UR: CLEAR
BACTERIA #/AREA URNS HPF: ABNORMAL /HPF
BILIRUB UR QL STRIP: NEGATIVE
COLOR UR AUTO: YELLOW
GLUCOSE UR STRIP-MCNC: NEGATIVE MG/DL
HGB UR QL STRIP: ABNORMAL
KETONES UR STRIP-MCNC: NEGATIVE MG/DL
LEUKOCYTE ESTERASE UR QL STRIP: NEGATIVE
NITRATE UR QL: NEGATIVE
PH UR STRIP: 5.5 [PH] (ref 5–8)
RBC #/AREA URNS AUTO: ABNORMAL /HPF
SP GR UR STRIP: >=1.03 (ref 1–1.03)
SQUAMOUS #/AREA URNS AUTO: ABNORMAL /LPF
UROBILINOGEN UR STRIP-ACNC: 0.2 E.U./DL
WBC #/AREA URNS AUTO: ABNORMAL /HPF

## 2024-05-10 PROCEDURE — 99214 OFFICE O/P EST MOD 30 MIN: CPT | Mod: GC

## 2024-05-10 PROCEDURE — 93000 ELECTROCARDIOGRAM COMPLETE: CPT | Mod: GC

## 2024-05-10 PROCEDURE — 81001 URINALYSIS AUTO W/SCOPE: CPT

## 2024-05-10 RX ORDER — AMLODIPINE BESYLATE 2.5 MG/1
2.5 TABLET ORAL DAILY
Qty: 30 TABLET | Refills: 0 | Status: SHIPPED | OUTPATIENT
Start: 2024-05-10 | End: 2024-06-07

## 2024-05-10 NOTE — PROGRESS NOTES
Preceptor Attestation:    I discussed the patient with the resident and evaluated the patient in person. I personally viewed the EKG and agree with the interpretation documented by the resident. I have verified the content of the note, which accurately reflects my assessment of the patient and the plan of care.   Supervising Physician:  Vishnu Akhtar MD.

## 2024-06-07 ENCOUNTER — OFFICE VISIT (OUTPATIENT)
Dept: FAMILY MEDICINE | Facility: CLINIC | Age: 59
End: 2024-06-07
Payer: COMMERCIAL

## 2024-06-07 VITALS
WEIGHT: 166.4 LBS | HEART RATE: 85 BPM | DIASTOLIC BLOOD PRESSURE: 83 MMHG | RESPIRATION RATE: 16 BRPM | SYSTOLIC BLOOD PRESSURE: 125 MMHG | HEIGHT: 65 IN | BODY MASS INDEX: 27.72 KG/M2 | OXYGEN SATURATION: 97 % | TEMPERATURE: 98.2 F

## 2024-06-07 DIAGNOSIS — Z71.6 ENCOUNTER FOR TOBACCO USE CESSATION COUNSELING: ICD-10-CM

## 2024-06-07 DIAGNOSIS — I10 BENIGN ESSENTIAL HYPERTENSION: Primary | ICD-10-CM

## 2024-06-07 DIAGNOSIS — Z87.891 PERSONAL HISTORY OF TOBACCO USE: ICD-10-CM

## 2024-06-07 PROCEDURE — 99213 OFFICE O/P EST LOW 20 MIN: CPT | Mod: GC

## 2024-06-07 RX ORDER — AMLODIPINE BESYLATE 2.5 MG/1
2.5 TABLET ORAL DAILY
Qty: 90 TABLET | Refills: 3 | Status: SHIPPED | OUTPATIENT
Start: 2024-06-07

## 2024-06-07 NOTE — PROGRESS NOTES
Preceptor Attestation:    I discussed the patient with the resident and evaluated the patient in person. I have verified the content of the note, which accurately reflects my assessment of the patient and the plan of care.   Supervising Physician:  Vishnu Akhtar MD.

## 2024-06-07 NOTE — PROGRESS NOTES
Assessment & Plan   Elijah Elaine 58 year old male here for follow up. PMH MDD, PTSD. BP in clinic elevated at last few visits and was not at goal, patient was started on amlodipine, taking as prescribed and tolerating well. Today, clinic BP improved to 125/83, reports similar readings at home.     Hypertension  Elevated BP despite working on smoking cessation and lifestyle changes, so amlodipine 2.5 mg started, now BP is at goal. Tobacco cessation likely contributing as well, Congratulated.   - Continue amlodipine 2.5 mg daily, refilled for one year    Tobacco Use  Quit 1.5 months ago, congratulated. Using nicotine gum as needed up to 3 to 4 times daily, trying to wean this as able.    - Continue nicotine gum as needed    Return in about 6 months (around 12/7/2024) for Follow up.    Subjective   Elijah is a 58 year old, presenting for the following health issues:  Recheck Medication, Follow Up (HTN), and Medication Reconciliation (Med reviewed)        3/22/2024     2:00 PM   Additional Questions   Roomed by TIMOTEO Guevara MA   Accompanied by Self         3/22/2024    Information    services provided? Yes   Miki Olivia   Type of interpretation provided Face-to-face    name Valerio Morales    [employer]     [facility]   Elijah Elaine 58 year old male here for follow up HTN, tobacco use. Feels well. No other concerns today.   Has been taking amlodipine 2.5 mg as prescribed. Denies SE.     Home BP: Did not bring logs, but checked for one week after starting amlodipine and reported numbers were very similar to his BP reading today in clinic.     Tobacco use: Quit 1.5 months ago, doing well. Using 3-4 pieces of gum per day as needed.   Review of Systems  Constitutional, HEENT, cardiovascular, pulmonary, gi and gu systems are negative, except as otherwise noted.      Objective    /83 (BP Location: Left arm, Patient Position: Sitting, Cuff Size: Adult Regular)   Pulse 85   Temp 98.2  F (36.8  " C) (Oral)   Resp 16   Ht 1.651 m (5' 5\")   Wt 75.5 kg (166 lb 6.4 oz)   SpO2 97%   BMI 27.69 kg/m    Body mass index is 27.69 kg/m .  Physical Exam   GENERAL: alert and no distress  EYES: Eyes grossly normal to inspection, conjunctivae and sclerae normal  RESP: Breathing comfortably on room air, not in respiratory distress  CV: regular rate, acyanotic, no peripheral edema  MS: no gross musculoskeletal defects noted, no edema      Discussed with attending, Dr. Akhtar.   Signed Electronically by: Samira Carter DO PGY2  "